# Patient Record
Sex: FEMALE | Race: WHITE
[De-identification: names, ages, dates, MRNs, and addresses within clinical notes are randomized per-mention and may not be internally consistent; named-entity substitution may affect disease eponyms.]

---

## 2020-07-30 ENCOUNTER — HOSPITAL ENCOUNTER (EMERGENCY)
Dept: HOSPITAL 74 - JER | Age: 55
Discharge: LEFT BEFORE DISCHARGE | End: 2020-07-30
Payer: COMMERCIAL

## 2020-07-30 VITALS — SYSTOLIC BLOOD PRESSURE: 97 MMHG | DIASTOLIC BLOOD PRESSURE: 55 MMHG | HEART RATE: 66 BPM

## 2020-07-30 VITALS — BODY MASS INDEX: 25.8 KG/M2

## 2020-07-30 VITALS — TEMPERATURE: 98.3 F

## 2020-07-30 DIAGNOSIS — F10.929: Primary | ICD-10-CM

## 2020-07-30 LAB
ALBUMIN SERPL-MCNC: 4.4 G/DL (ref 3.4–5)
ALP SERPL-CCNC: 83 U/L (ref 45–117)
ALT SERPL-CCNC: 39 U/L (ref 13–61)
ANION GAP SERPL CALC-SCNC: 12 MMOL/L (ref 8–16)
APTT BLD: 29.9 SECONDS (ref 25.2–36.5)
AST SERPL-CCNC: 25 U/L (ref 15–37)
BASOPHILS # BLD: 0.7 % (ref 0–2)
BILIRUB SERPL-MCNC: 1 MG/DL (ref 0.2–1)
BUN SERPL-MCNC: 9.9 MG/DL (ref 7–18)
CALCIUM SERPL-MCNC: 9.9 MG/DL (ref 8.5–10.1)
CHLORIDE SERPL-SCNC: 105 MMOL/L (ref 98–107)
CO2 SERPL-SCNC: 25 MMOL/L (ref 21–32)
CREAT SERPL-MCNC: 2 MG/DL (ref 0.55–1.3)
DEPRECATED RDW RBC AUTO: 14.4 % (ref 11.6–15.6)
EOSINOPHIL # BLD: 0 % (ref 0–4.5)
GLUCOSE SERPL-MCNC: 88 MG/DL (ref 74–106)
HCT VFR BLD CALC: 46.2 % (ref 32.4–45.2)
HGB BLD-MCNC: 15.4 GM/DL (ref 10.7–15.3)
INR BLD: 0.92 (ref 0.83–1.09)
LYMPHOCYTES # BLD: 28.2 % (ref 8–40)
MCH RBC QN AUTO: 29.1 PG (ref 25.7–33.7)
MCHC RBC AUTO-ENTMCNC: 33.3 G/DL (ref 32–36)
MCV RBC: 87.1 FL (ref 80–96)
MONOCYTES # BLD AUTO: 9.6 % (ref 3.8–10.2)
NEUTROPHILS # BLD: 61.5 % (ref 42.8–82.8)
PLATELET # BLD AUTO: 318 K/MM3 (ref 134–434)
PMV BLD: 7.3 FL (ref 7.5–11.1)
POTASSIUM SERPLBLD-SCNC: 4.3 MMOL/L (ref 3.5–5.1)
PROT SERPL-MCNC: 7.9 G/DL (ref 6.4–8.2)
PT PNL PPP: 10.8 SEC (ref 9.7–13)
RBC # BLD AUTO: 5.3 M/MM3 (ref 3.6–5.2)
SODIUM SERPL-SCNC: 142 MMOL/L (ref 136–145)
WBC # BLD AUTO: 6.8 K/MM3 (ref 4–10)

## 2020-07-30 PROCEDURE — 3E033GC INTRODUCTION OF OTHER THERAPEUTIC SUBSTANCE INTO PERIPHERAL VEIN, PERCUTANEOUS APPROACH: ICD-10-PCS

## 2020-07-30 NOTE — PDOC
Rapid Medical Evaluation


Time Seen by Provider: 07/30/20 14:16


Medical Evaluation: 





07/30/20 14:16


I have preformed a brief in person exam





HPI: poor historian 55 year old female ? ETOH abuse complaining of vomiting 

after vodka consumption last drink yesterday.  





PE: 


weak ? intoxicated 


uncooperative 





Plan:


Labs 


IVF





Pt to precede to ED for further eval

## 2020-07-30 NOTE — PDOC
History of Present Illness





- General


Chief Complaint: Alcohol intoxication


Stated Complaint: Alcohol intoxication


Time Seen by Provider: 07/30/20 14:16


History Source: Patient


Exam Limitations: No Limitations





- History of Present Illness


Initial Comments: 





07/30/20 17:29


55-year-old female history of alcohol abuse brought in by a friend for alcohol 

intoxication, nausea and vomiting since yesterday.  History obtained mostly by 

the friend (Ama Villeda [phone]). patient has been drinking vodka for 

the past 3 days.  Patient is a poor historian however denies use of illicit 

drugs, denies chest pain, shortness of breath, abdominal pain, trauma.





ROS: as above





PE:


GENERAL: + AOB, arousable


HEAD: NCAT


EYES: Pupils equal, round and reactive to light, sclera anicteric, conjunctiva 

clear


ENT: pharynx: no erythema, no exudate, uvula midline


NECK: supple


CHEST: nontender


RESP: clear, no w/r/r


CARDIO: rrr, no m/g/r


ABD: +BS, soft, nontender, non distended


BACK: no midline spinal ttp, no CVAT 


EXTREMITIES: Normal range of motion, no edema


NEURO: Slurred speech


SKIN: Warm, Dry





Is this a multiple visit Asthma Patient?: No





Past History





- Medical History


Allergies/Adverse Reactions: 


                                    Allergies











Allergy/AdvReac Type Severity Reaction Status Date / Time


 


No Known Allergies Allergy   Verified 07/30/20 14:17











COPD: No


Psychiatric Problems: Yes (etoh abuse)





- Psycho-Social/Smoking History


Smoking History: Never smoked





- Substance Abuse Hx (Audit-C & DAST Scrn)


How often the patient has a drink containing alcohol: Never


Score: In Men: 4 or > Positive; In Women: 3 or > Positive: 0


Screen Result (Pos requires Nsg. Audit-10AR): Negative





*Physical Exam





- Vital Signs


                                Last Vital Signs











Temp Pulse Resp BP Pulse Ox


 


 98.3 F   112 H  18   125/80   100 


 


 07/30/20 14:17  07/30/20 14:17  07/30/20 14:17  07/30/20 14:17  07/30/20 14:17














ED Treatment Course





- LABORATORY


CBC & Chemistry Diagram: 


                                 07/30/20 17:00





                                 07/30/20 17:00





- ADDITIONAL ORDERS


Additional order review: 


                               Laboratory  Results











  07/30/20





  17:00


 


PT with INR  10.80


 


INR  0.92


 


PTT (Actin FS)  29.9








                                        











  07/30/20





  17:00


 


RBC  5.30 H


 


MCV  87.1


 


MCHC  33.3


 


RDW  14.4


 


MPV  7.3 L


 


Neutrophils %  61.5


 


Lymphocytes %  28.2


 


Monocytes %  9.6


 


Eosinophils %  0.0


 


Basophils %  0.7














- Medications


Given in the ED: 


ED Medications














Discontinued Medications














Generic Name Dose Route Start Last Admin





  Trade Name Deepak  PRN Reason Stop Dose Admin


 


Famotidine/Sodium Chloride  20 mg in 50 mls @ 100 mls/hr  07/30/20 14:22  

07/30/20 17:17





  Pepcid 20 Mg Premixed Ivpb -  IVPB  07/30/20 14:51  100 mls/hr





  ONCE ONE   Administration


 


Ondansetron HCl  4 mg  07/30/20 14:21  07/30/20 17:17





  Zofran Injection  IVPUSH  07/30/20 14:22  4 mg





  ONCE ONE   Administration


 


Sodium Chloride  1,000 ml  07/30/20 14:21  07/30/20 17:17





  Normal Saline -  IV  07/30/20 14:22  1,000 ml





  ONCE ONE   Administration














Medical Decision Making





- Medical Decision Making





07/30/20 19:07


55-year-old female history of alcohol abuse brought in by a friend for alcohol 

intoxication, nausea and vomiting since yesterday.  History obtained mostly by 

the friend (Ama Villeda [phone]). patient has been drinking vodka for 

the past 3 days.  Patient is a poor historian however denies use of illicit 

drugs, denies chest pain, shortness of breath, abdominal pain, trauma.





Review labs, lactate 2.4


Patient has ambulated to the restroom twice while in the ED


Patient pulled out IV and is requesting to go home


However she agrees to drink fluids p.o., acetaminophen given


Will observe and repeat lactate


Reassess





07/30/20 20:37


As per TWAN Lux patient walked out of ED after receiving acetaminophen








Discharge





- Discharge Information


Problems reviewed: Yes


Clinical Impression/Diagnosis: 


Alcohol intoxication


Qualifiers:


 Complication of substance-induced condition: with unspecified complication Q

ualified Code(s): F10.929 - Alcohol use, unspecified with intoxication, 

unspecified





Disposition: ELOPED





- Follow up/Referral





- Patient Discharge Instructions





- Post Discharge Activity